# Patient Record
Sex: MALE | Race: WHITE
[De-identification: names, ages, dates, MRNs, and addresses within clinical notes are randomized per-mention and may not be internally consistent; named-entity substitution may affect disease eponyms.]

---

## 2021-04-12 ENCOUNTER — HOSPITAL ENCOUNTER (OUTPATIENT)
Dept: HOSPITAL 79 - LBRF | Age: 86
End: 2021-04-12
Attending: FAMILY MEDICINE
Payer: MEDICARE

## 2021-04-12 DIAGNOSIS — Z53.8: Primary | ICD-10-CM

## 2021-04-15 ENCOUNTER — HOSPITAL ENCOUNTER (INPATIENT)
Dept: HOSPITAL 79 - ER1 | Age: 86
LOS: 1 days | Discharge: LEFT BEFORE DISCHARGE | DRG: 177 | End: 2021-04-16
Attending: STUDENT IN AN ORGANIZED HEALTH CARE EDUCATION/TRAINING PROGRAM | Admitting: STUDENT IN AN ORGANIZED HEALTH CARE EDUCATION/TRAINING PROGRAM
Payer: MEDICARE

## 2021-04-15 VITALS — WEIGHT: 185 LBS | BODY MASS INDEX: 25.9 KG/M2 | HEIGHT: 71 IN

## 2021-04-15 DIAGNOSIS — Z82.49: ICD-10-CM

## 2021-04-15 DIAGNOSIS — N18.9: ICD-10-CM

## 2021-04-15 DIAGNOSIS — N17.9: ICD-10-CM

## 2021-04-15 DIAGNOSIS — I26.99: ICD-10-CM

## 2021-04-15 DIAGNOSIS — I12.9: ICD-10-CM

## 2021-04-15 DIAGNOSIS — J12.82: ICD-10-CM

## 2021-04-15 DIAGNOSIS — U07.1: Primary | ICD-10-CM

## 2021-04-15 DIAGNOSIS — J44.1: ICD-10-CM

## 2021-04-15 DIAGNOSIS — D69.6: ICD-10-CM

## 2021-04-15 DIAGNOSIS — E11.40: ICD-10-CM

## 2021-04-15 DIAGNOSIS — E11.22: ICD-10-CM

## 2021-04-15 DIAGNOSIS — Z79.84: ICD-10-CM

## 2021-04-15 DIAGNOSIS — E44.0: ICD-10-CM

## 2021-04-15 DIAGNOSIS — I25.10: ICD-10-CM

## 2021-04-15 DIAGNOSIS — E87.2: ICD-10-CM

## 2021-04-15 DIAGNOSIS — E03.9: ICD-10-CM

## 2021-04-15 DIAGNOSIS — J15.9: ICD-10-CM

## 2021-04-15 DIAGNOSIS — D50.9: ICD-10-CM

## 2021-04-15 DIAGNOSIS — Z95.1: ICD-10-CM

## 2021-04-15 DIAGNOSIS — D68.69: ICD-10-CM

## 2021-04-15 DIAGNOSIS — J44.0: ICD-10-CM

## 2021-04-15 DIAGNOSIS — J96.01: ICD-10-CM

## 2021-04-15 DIAGNOSIS — E87.1: ICD-10-CM

## 2021-04-15 LAB
BUN/CREATININE RATIO: 22 (ref 0–10)
HGB BLD-MCNC: 10.5 GM/DL (ref 14–17.5)
RED BLOOD COUNT: 4.13 M/UL (ref 4.2–5.5)
WHITE BLOOD COUNT: 4.1 K/UL (ref 4.5–11)

## 2021-04-15 PROCEDURE — G0378 HOSPITAL OBSERVATION PER HR: HCPCS

## 2021-04-15 PROCEDURE — XW033E5 INTRODUCTION OF REMDESIVIR ANTI-INFECTIVE INTO PERIPHERAL VEIN, PERCUTANEOUS APPROACH, NEW TECHNOLOGY GROUP 5: ICD-10-PCS | Performed by: INTERNAL MEDICINE

## 2021-04-15 PROCEDURE — C9113 INJ PANTOPRAZOLE SODIUM, VIA: HCPCS

## 2021-04-15 PROCEDURE — 8E0ZXY6 ISOLATION: ICD-10-PCS | Performed by: INTERNAL MEDICINE

## 2021-04-16 LAB
BUN/CREATININE RATIO: 21 (ref 0–10)
HGB BLD-MCNC: 10.6 GM/DL (ref 14–17.5)
RED BLOOD COUNT: 4.17 M/UL (ref 4.2–5.5)
WHITE BLOOD COUNT: 6.4 K/UL (ref 4.5–11)

## 2021-04-16 PROCEDURE — 3E0333Z INTRODUCTION OF ANTI-INFLAMMATORY INTO PERIPHERAL VEIN, PERCUTANEOUS APPROACH: ICD-10-PCS | Performed by: INTERNAL MEDICINE

## 2021-04-25 ENCOUNTER — HOSPITAL ENCOUNTER (EMERGENCY)
Dept: HOSPITAL 79 - ER1 | Age: 86
Discharge: OTHER INSTITUTION | End: 2021-04-25
Payer: MEDICARE

## 2021-04-25 DIAGNOSIS — I10: ICD-10-CM

## 2021-04-25 DIAGNOSIS — A41.9: Primary | ICD-10-CM

## 2021-04-25 DIAGNOSIS — Z95.1: ICD-10-CM

## 2021-04-25 DIAGNOSIS — E87.1: ICD-10-CM

## 2021-04-25 DIAGNOSIS — I25.10: ICD-10-CM

## 2021-04-25 DIAGNOSIS — U07.1: ICD-10-CM

## 2021-04-25 DIAGNOSIS — E11.65: ICD-10-CM

## 2021-04-25 DIAGNOSIS — J12.82: ICD-10-CM

## 2021-04-25 LAB
BUN/CREATININE RATIO: 43 (ref 0–10)
HGB BLD-MCNC: 12.6 GM/DL (ref 14–17.5)
RED BLOOD COUNT: 4.97 M/UL (ref 4.2–5.5)
WHITE BLOOD COUNT: 12.8 K/UL (ref 4.5–11)

## 2021-05-07 ENCOUNTER — HOSPITAL ENCOUNTER (INPATIENT)
Dept: HOSPITAL 79 - ER1 | Age: 86
LOS: 10 days | Discharge: HOME | DRG: 377 | End: 2021-05-17
Attending: INTERNAL MEDICINE | Admitting: INTERNAL MEDICINE
Payer: MEDICARE

## 2021-05-07 VITALS — HEIGHT: 70 IN | WEIGHT: 93.5 LBS | BODY MASS INDEX: 13.39 KG/M2

## 2021-05-07 DIAGNOSIS — Z87.01: ICD-10-CM

## 2021-05-07 DIAGNOSIS — E11.22: ICD-10-CM

## 2021-05-07 DIAGNOSIS — M31.30: ICD-10-CM

## 2021-05-07 DIAGNOSIS — Z86.16: ICD-10-CM

## 2021-05-07 DIAGNOSIS — J44.9: ICD-10-CM

## 2021-05-07 DIAGNOSIS — N18.30: ICD-10-CM

## 2021-05-07 DIAGNOSIS — Z79.4: ICD-10-CM

## 2021-05-07 DIAGNOSIS — Z82.49: ICD-10-CM

## 2021-05-07 DIAGNOSIS — Z95.1: ICD-10-CM

## 2021-05-07 DIAGNOSIS — K21.00: ICD-10-CM

## 2021-05-07 DIAGNOSIS — Z79.82: ICD-10-CM

## 2021-05-07 DIAGNOSIS — Z79.01: ICD-10-CM

## 2021-05-07 DIAGNOSIS — D63.1: ICD-10-CM

## 2021-05-07 DIAGNOSIS — D69.6: ICD-10-CM

## 2021-05-07 DIAGNOSIS — D61.818: ICD-10-CM

## 2021-05-07 DIAGNOSIS — I21.A1: ICD-10-CM

## 2021-05-07 DIAGNOSIS — I46.8: ICD-10-CM

## 2021-05-07 DIAGNOSIS — E87.2: ICD-10-CM

## 2021-05-07 DIAGNOSIS — J96.01: ICD-10-CM

## 2021-05-07 DIAGNOSIS — K26.4: Primary | ICD-10-CM

## 2021-05-07 DIAGNOSIS — E87.1: ICD-10-CM

## 2021-05-07 DIAGNOSIS — I25.10: ICD-10-CM

## 2021-05-07 DIAGNOSIS — E87.6: ICD-10-CM

## 2021-05-07 DIAGNOSIS — D62: ICD-10-CM

## 2021-05-07 DIAGNOSIS — E03.9: ICD-10-CM

## 2021-05-07 DIAGNOSIS — I08.0: ICD-10-CM

## 2021-05-07 DIAGNOSIS — R57.0: ICD-10-CM

## 2021-05-07 DIAGNOSIS — I12.9: ICD-10-CM

## 2021-05-07 DIAGNOSIS — E43: ICD-10-CM

## 2021-05-07 DIAGNOSIS — R57.1: ICD-10-CM

## 2021-05-07 LAB
BUN/CREATININE RATIO: 53 (ref 0–10)
HGB BLD-MCNC: 8 GM/DL (ref 14–17.5)
HGB BLD-MCNC: 9.3 GM/DL (ref 14–17.5)
RED BLOOD COUNT: 3.17 M/UL (ref 4.2–5.5)
RED BLOOD COUNT: 3.59 M/UL (ref 4.2–5.5)
WHITE BLOOD COUNT: 12.3 K/UL (ref 4.5–11)
WHITE BLOOD COUNT: 7.2 K/UL (ref 4.5–11)

## 2021-05-07 PROCEDURE — 30233N1 TRANSFUSION OF NONAUTOLOGOUS RED BLOOD CELLS INTO PERIPHERAL VEIN, PERCUTANEOUS APPROACH: ICD-10-PCS | Performed by: INTERNAL MEDICINE

## 2021-05-07 PROCEDURE — C1751 CATH, INF, PER/CENT/MIDLINE: HCPCS

## 2021-05-07 PROCEDURE — P9016 RBC LEUKOCYTES REDUCED: HCPCS

## 2021-05-07 PROCEDURE — A6212 FOAM DRG <=16 SQ IN W/BORDER: HCPCS

## 2021-05-07 PROCEDURE — C9113 INJ PANTOPRAZOLE SODIUM, VIA: HCPCS

## 2021-05-08 LAB
BUN/CREATININE RATIO: 54 (ref 0–10)
HGB BLD-MCNC: 7.1 GM/DL (ref 14–17.5)
HGB BLD-MCNC: 8.1 GM/DL (ref 14–17.5)
HGB BLD-MCNC: 8.3 GM/DL (ref 14–17.5)
HGB BLD-MCNC: 8.6 GM/DL (ref 14–17.5)
RED BLOOD COUNT: 2.69 M/UL (ref 4.2–5.5)
RED BLOOD COUNT: 3.05 M/UL (ref 4.2–5.5)
RED BLOOD COUNT: 3.15 M/UL (ref 4.2–5.5)
RED BLOOD COUNT: 3.24 M/UL (ref 4.2–5.5)
WHITE BLOOD COUNT: 5 K/UL (ref 4.5–11)
WHITE BLOOD COUNT: 5.3 K/UL (ref 4.5–11)
WHITE BLOOD COUNT: 6.6 K/UL (ref 4.5–11)
WHITE BLOOD COUNT: 7.7 K/UL (ref 4.5–11)

## 2021-05-09 LAB
BUN/CREATININE RATIO: 53 (ref 0–10)
HGB BLD-MCNC: 10.1 GM/DL (ref 14–17.5)
HGB BLD-MCNC: 7.6 GM/DL (ref 14–17.5)
HGB BLD-MCNC: 7.7 GM/DL (ref 14–17.5)
HGB BLD-MCNC: 9.7 GM/DL (ref 14–17.5)
HGB BLD-MCNC: 9.8 GM/DL (ref 14–17.5)
HGB BLD-MCNC: 9.9 GM/DL (ref 14–17.5)
RED BLOOD COUNT: 2.88 M/UL (ref 4.2–5.5)
WHITE BLOOD COUNT: 9.5 K/UL (ref 4.5–11)

## 2021-05-09 PROCEDURE — 5A1945Z RESPIRATORY VENTILATION, 24-96 CONSECUTIVE HOURS: ICD-10-PCS | Performed by: INTERNAL MEDICINE

## 2021-05-09 PROCEDURE — 02HV33Z INSERTION OF INFUSION DEVICE INTO SUPERIOR VENA CAVA, PERCUTANEOUS APPROACH: ICD-10-PCS | Performed by: INTERNAL MEDICINE

## 2021-05-09 PROCEDURE — 0W3P8ZZ CONTROL BLEEDING IN GASTROINTESTINAL TRACT, VIA NATURAL OR ARTIFICIAL OPENING ENDOSCOPIC: ICD-10-PCS | Performed by: INTERNAL MEDICINE

## 2021-05-09 PROCEDURE — B548ZZA ULTRASONOGRAPHY OF SUPERIOR VENA CAVA, GUIDANCE: ICD-10-PCS | Performed by: INTERNAL MEDICINE

## 2021-05-09 PROCEDURE — 0BH17EZ INSERTION OF ENDOTRACHEAL AIRWAY INTO TRACHEA, VIA NATURAL OR ARTIFICIAL OPENING: ICD-10-PCS | Performed by: INTERNAL MEDICINE

## 2021-05-09 PROCEDURE — 3E033XZ INTRODUCTION OF VASOPRESSOR INTO PERIPHERAL VEIN, PERCUTANEOUS APPROACH: ICD-10-PCS | Performed by: INTERNAL MEDICINE

## 2021-05-09 NOTE — NUR
0350 PT ARRIVED TO ICU
0400 MD NAGY AT BEDSIDE, VERBAL ORDER TO TITRATE LEVO TO 30. ORDERS PLACED
FOR LABS. ORDERS TO MAINTAIN SBP > 120.
0510 MD NAGY AWARE OF RECENT LABS (LACTIC, HGB). ORDERS TO STOP IVF AND TO
MAINTAIN MAP > 65.

## 2021-05-10 LAB
BUN/CREATININE RATIO: 36 (ref 0–10)
HGB BLD-MCNC: 9.6 GM/DL (ref 14–17.5)
RED BLOOD COUNT: 3.39 M/UL (ref 4.2–5.5)
WHITE BLOOD COUNT: 10 K/UL (ref 4.5–11)

## 2021-05-10 PROCEDURE — B24BZZ4 ULTRASONOGRAPHY OF HEART WITH AORTA, TRANSESOPHAGEAL: ICD-10-PCS | Performed by: INTERNAL MEDICINE

## 2021-05-11 LAB
BUN/CREATININE RATIO: 27 (ref 0–10)
HGB BLD-MCNC: 8.7 GM/DL (ref 14–17.5)
RED BLOOD COUNT: 3.06 M/UL (ref 4.2–5.5)
WHITE BLOOD COUNT: 8.8 K/UL (ref 4.5–11)

## 2021-05-12 LAB
BUN/CREATININE RATIO: 20 (ref 0–10)
HGB BLD-MCNC: 8 GM/DL (ref 14–17.5)
HGB BLD-MCNC: 8.1 GM/DL (ref 14–17.5)
RED BLOOD COUNT: 2.82 M/UL (ref 4.2–5.5)
WHITE BLOOD COUNT: 4.7 K/UL (ref 4.5–11)

## 2021-05-13 LAB
BUN/CREATININE RATIO: 19 (ref 0–10)
HGB BLD-MCNC: 8 GM/DL (ref 14–17.5)
RED BLOOD COUNT: 2.85 M/UL (ref 4.2–5.5)
WHITE BLOOD COUNT: 4 K/UL (ref 4.5–11)

## 2021-05-14 LAB
BUN/CREATININE RATIO: 26 (ref 0–10)
HGB BLD-MCNC: 7.8 GM/DL (ref 14–17.5)
RED BLOOD COUNT: 2.73 M/UL (ref 4.2–5.5)
WHITE BLOOD COUNT: 2.7 K/UL (ref 4.5–11)

## 2021-05-15 LAB
BUN/CREATININE RATIO: 18 (ref 0–10)
HGB BLD-MCNC: 9.1 GM/DL (ref 14–17.5)
RED BLOOD COUNT: 3.33 M/UL (ref 4.2–5.5)
WHITE BLOOD COUNT: 3.2 K/UL (ref 4.5–11)

## 2021-05-16 LAB
BUN/CREATININE RATIO: 17 (ref 0–10)
HGB BLD-MCNC: 8.7 GM/DL (ref 14–17.5)
RED BLOOD COUNT: 3.16 M/UL (ref 4.2–5.5)
WHITE BLOOD COUNT: 3.8 K/UL (ref 4.5–11)

## 2021-05-17 LAB
BUN/CREATININE RATIO: 14 (ref 0–10)
HGB BLD-MCNC: 9 GM/DL (ref 14–17.5)
RED BLOOD COUNT: 3.32 M/UL (ref 4.2–5.5)
WHITE BLOOD COUNT: 3.2 K/UL (ref 4.5–11)

## 2022-09-09 ENCOUNTER — HOSPITAL ENCOUNTER (EMERGENCY)
Dept: HOSPITAL 79 - ER1 | Age: 87
Discharge: HOME | End: 2022-09-09
Payer: MEDICARE

## 2022-09-09 DIAGNOSIS — E11.9: ICD-10-CM

## 2022-09-09 DIAGNOSIS — E78.5: ICD-10-CM

## 2022-09-09 DIAGNOSIS — I10: ICD-10-CM

## 2022-09-09 DIAGNOSIS — Z95.1: ICD-10-CM

## 2022-09-09 DIAGNOSIS — L03.312: Primary | ICD-10-CM

## 2022-09-09 LAB
BUN/CREATININE RATIO: 17 (ref 0–10)
HGB BLD-MCNC: 12.7 GM/DL (ref 14–17.5)
RED BLOOD COUNT: 4.53 M/UL (ref 4.2–5.5)
WHITE BLOOD COUNT: 9 K/UL (ref 4.5–11)